# Patient Record
Sex: FEMALE | Race: WHITE
[De-identification: names, ages, dates, MRNs, and addresses within clinical notes are randomized per-mention and may not be internally consistent; named-entity substitution may affect disease eponyms.]

---

## 2015-12-30 VITALS — SYSTOLIC BLOOD PRESSURE: 117 MMHG | DIASTOLIC BLOOD PRESSURE: 81 MMHG

## 2016-02-07 VITALS
DIASTOLIC BLOOD PRESSURE: 91 MMHG | DIASTOLIC BLOOD PRESSURE: 91 MMHG | SYSTOLIC BLOOD PRESSURE: 127 MMHG | SYSTOLIC BLOOD PRESSURE: 127 MMHG

## 2017-03-21 ENCOUNTER — HOSPITAL ENCOUNTER (OUTPATIENT)
Dept: HOSPITAL 63 - ECHO | Age: 37
Discharge: HOME | End: 2017-03-21
Payer: COMMERCIAL

## 2017-03-21 DIAGNOSIS — I07.1: ICD-10-CM

## 2017-03-21 DIAGNOSIS — R00.2: Primary | ICD-10-CM

## 2017-03-21 PROCEDURE — 93306 TTE W/DOPPLER COMPLETE: CPT

## 2017-03-21 NOTE — CARD
--------------- APPROVED REPORT --------------





EXAM: Two-dimensional and M-mode echocardiogram with Doppler and color Doppler.



Other Information 

Quality : Average

Rhythm : NSR



INDICATION

Palpitations 



2D DIMENSIONS 

RVDd2.9 (2.9-3.5cm)Left Atrium(2D)2.9 (1.6-4.0cm)

IVSd0.8 (0.7-1.1cm)Aortic Root(2D)2.9 (2.0-3.7cm)

LVDd4.6 (3.9-5.9cm)PWd0.8 (0.7-1.1cm)

LVDs3.1 (2.5-4.0cm)FS (%) 32.4 %

SV60.4 mlLVEF(%)60.7 (>50%)



Aortic Valve

AoV Peak Jayson.93.2cm/sAoV VTI15.9cm

AO Peak GR.3.5mmHgAO Mean GR.2mmHg

YUMIKO   (VTI)3.38cm2



Mitral Valve

MV E Jlrsvujw26.3cm/sMV E Peak Gr.3mmHg

MV DECEL [id number] A Alqrpsbq40.3cm/s

MV E Mean Gr.2mmHgMV [id number]

E/A  Ratio1.0MV A [id number]

MVA (PHT)3.93cm2



Tricuspid Valve

TR P. Luwqjhgo570mf/sRAP [id number]

TR Peak Gr.72xrHgNKNV44ewHg



 LEFT VENTRICLE 

The left ventricle is normal size. There is normal left ventricular wall thickness. Left ventricle sy
stolic function is normal. The Ejection Fraction is 55-60%. There is normal LV segmental wall motion.
 The left ventricular diastolic function and filling is normal for age.



 RIGHT VENTRICLE 

The right ventricle is normal size. The right ventricular systolic function is normal.



 ATRIA 

The left atrium size is normal. The right atrium size is normal. The interatrial septum is intact wit
h no evidence for an atrial septal defect or patent foramen ovale as noted on 2-D or Doppler imaging.




 AORTIC VALVE 

The aortic valve is normal in structure and function. The aortic valve is trileaflet. Doppler and Col
or Flow revealed no significant aortic regurgitation. There is no significant aortic valvular stenosi
s.



 MITRAL VALVE 

The mitral valve is normal in structure and function. There is no mitral valve stenosis. Doppler and 
Color Flow revealed no mitral valve regurgitation noted.



 TRICUSPID VALVE 

The tricuspid valve is normal in structure and function. Doppler and Color Flow revealed mild tricusp
id regurgitation. The PA pressure was estimated at 38 mmHg. There is no tricuspid valve stenosis.



 PULMONIC VALVE 

The pulmonic valve is not well visualized. Doppler and Color Flow revealed no pulmonic valvular regur
gitation. There is no pulmonic valvular stenosis.



 GREAT VESSELS 

The aortic root is normal in size. Pulmonary veins not recorded. The IVC is normal in size and collap
ses >50% with inspiration.



 PERICARDIAL EFFUSION 

There is no evidence of significant pericardial effusion.



Critical Notification

Critical Value: No



<Conclusion>

The aortic valve is normal in structure and function.

Left ventricle systolic function is normal. The Ejection Fraction is 55-60%.

There is normal LV segmental wall motion.

Doppler and Color Flow revealed mild tricuspid regurgitation.  The PA pressure was estimated at 38 mm
Hg.

## 2017-04-04 ENCOUNTER — HOSPITAL ENCOUNTER (OUTPATIENT)
Dept: HOSPITAL 63 - DXRADRC | Age: 37
Discharge: HOME | End: 2017-04-04
Attending: NURSE PRACTITIONER
Payer: COMMERCIAL

## 2017-04-04 DIAGNOSIS — M51.36: Primary | ICD-10-CM

## 2017-04-04 DIAGNOSIS — M41.87: ICD-10-CM

## 2017-04-04 DIAGNOSIS — M54.5: ICD-10-CM

## 2017-04-04 PROCEDURE — 72100 X-RAY EXAM L-S SPINE 2/3 VWS: CPT

## 2017-04-04 NOTE — RAD
Lumbar spine radiographs 



History:  Lumbar pain for several months.



Comparison:  1/7/2011.



Findings:  



AP and lateral views lumbar spine, 3 images. Mild levoconvex rotary scoliosis

of lumbar spine is seen. No acute fracture or malalignment is identified. 5

lumbar type vertebral bodies are present. Intervertebral disc heights appear

preserved. Mild facet degeneration is seen. No spondylolysis or

spondylolisthesis is seen.



Impression:  

Mild levoconvex scoliosis, similar to previous study. Mild facet degeneration.

## 2018-01-07 ENCOUNTER — HOSPITAL ENCOUNTER (EMERGENCY)
Dept: HOSPITAL 63 - ER | Age: 38
Discharge: HOME | End: 2018-01-07
Payer: COMMERCIAL

## 2018-01-07 VITALS — DIASTOLIC BLOOD PRESSURE: 84 MMHG | SYSTOLIC BLOOD PRESSURE: 141 MMHG

## 2018-01-07 VITALS — HEIGHT: 69 IN | WEIGHT: 233.69 LBS | BODY MASS INDEX: 34.61 KG/M2

## 2018-01-07 DIAGNOSIS — Z88.8: ICD-10-CM

## 2018-01-07 DIAGNOSIS — R53.81: ICD-10-CM

## 2018-01-07 DIAGNOSIS — J45.909: ICD-10-CM

## 2018-01-07 DIAGNOSIS — R05: Primary | ICD-10-CM

## 2018-01-07 DIAGNOSIS — Z88.2: ICD-10-CM

## 2018-01-07 DIAGNOSIS — Z88.4: ICD-10-CM

## 2018-01-07 DIAGNOSIS — R06.02: ICD-10-CM

## 2018-01-07 LAB
ANION GAP SERPL CALC-SCNC: 9 MMOL/L (ref 6–14)
BASOPHILS # BLD AUTO: 0.1 X10^3/UL (ref 0–0.2)
BASOPHILS NFR BLD: 1 % (ref 0–3)
CHLORIDE SERPL-SCNC: 103 MMOL/L (ref 98–107)
CO2 SERPL-SCNC: 30 MMOL/L (ref 21–32)
EOSINOPHIL NFR BLD: 0 X10^3/UL (ref 0–0.7)
EOSINOPHIL NFR BLD: 1 % (ref 0–3)
ERYTHROCYTE [DISTWIDTH] IN BLOOD BY AUTOMATED COUNT: 13.1 % (ref 11.5–14.5)
HCT VFR BLD CALC: 43.5 % (ref 36–47)
HGB BLD-MCNC: 15.4 G/DL (ref 12–15.5)
LYMPHOCYTES # BLD: 4.1 X10^3/UL (ref 1–4.8)
LYMPHOCYTES NFR BLD AUTO: 46 % (ref 24–48)
MCH RBC QN AUTO: 29 PG (ref 25–35)
MCHC RBC AUTO-ENTMCNC: 35 G/DL (ref 31–37)
MCV RBC AUTO: 82 FL (ref 79–100)
MONO #: 0.5 X10^3/UL (ref 0–1.1)
MONOCYTES NFR BLD: 5 % (ref 0–9)
NEUT #: 4.2 X10^3UL (ref 1.8–7.7)
NEUTROPHILS NFR BLD AUTO: 48 % (ref 31–73)
PLATELET # BLD AUTO: 239 X10^3/UL (ref 140–400)
POTASSIUM SERPL-SCNC: 3.4 MMOL/L (ref 3.5–5.1)
RBC # BLD AUTO: 5.33 X10^6/UL (ref 3.5–5.4)
SODIUM SERPL-SCNC: 142 MMOL/L (ref 136–145)
WBC # BLD AUTO: 8.9 X10^3/UL (ref 4–11)

## 2018-01-07 PROCEDURE — 71046 X-RAY EXAM CHEST 2 VIEWS: CPT

## 2018-01-07 PROCEDURE — 36415 COLL VENOUS BLD VENIPUNCTURE: CPT

## 2018-01-07 PROCEDURE — 85025 COMPLETE CBC W/AUTO DIFF WBC: CPT

## 2018-01-07 PROCEDURE — 81025 URINE PREGNANCY TEST: CPT

## 2018-01-07 PROCEDURE — 80051 ELECTROLYTE PANEL: CPT

## 2018-01-07 PROCEDURE — 99285 EMERGENCY DEPT VISIT HI MDM: CPT

## 2018-01-07 PROCEDURE — 83605 ASSAY OF LACTIC ACID: CPT

## 2018-01-07 NOTE — PHYS DOC
Past History


Past Medical History:  Asthma


Past Surgical History:  Other


Alcohol Use:  None


Drug Use:  None





Adult General


Chief Complaint


Chief Complaint:  ALLERGIC REACTION





HPI


HPI





Patient is a 37-year-old female who presents with complaints of persistent 

cough and malaise. Patient is already on antibiotics. Patient has been recently 

treated both for bronchitis as well as sinusitis.  Patient have a little bit of 

a skin reaction after getting some antibiotics at doctor's office but that rash 

is resolving. Patient just doesn't feel that she is getting any better but has 

only been taking the most recent about a prescription for 2 days. Patient 

denies being a smoker.





Review of Systems


Review of Systems





Constitutional: Denies fever or chills []


Eyes: Denies change in visual acuity, redness, or eye pain []


HENT: Yes to nasal congestion, sore throat


Respiratory: Yes to cough, some shortness of breath


Cardiovascular: No chest pain


GI: Denies abdominal pain, nausea, vomiting, 





Musculoskeletal: Denies back pain or joint pain []


Integument: Diffuse erythema abdomen that is resolving


Neurologic: Denies headache, focal weakness or sensory changes []








All other systems were reviewed and found to be within normal limits, except as 

documented in this note.





Current Medications


Current Medications





Current Medications








 Medications


  (Trade)  Dose


 Ordered  Sig/Gely  Start Time


 Stop Time Status Last Admin


Dose Admin


 


 Promethazine HCl/


 Codeine


  (Phenergan With


 Codeine)  10 ml  STAT  PRN  1/7/18 22:00


   UNV  


 











Allergies


Allergies





Allergies








Coded Allergies Type Severity Reaction Last Updated Verified


 


  Sulfa (Sulfonamide Antibiotics) Allergy Intermediate Rash 1/7/18 Yes


 


  nalbuphine Allergy Intermediate  1/28/16 Yes


 


  Anesthetics - Brandie Type- Parabens Adverse Reaction Intermediate HYPERTHERMIA 

1/7/18 Yes











Physical Exam


Physical Exam





Constitutional: Well developed, well nourished, no acute distress, non-toxic 

appearance. []


HENT: Normocephalic, atraumatic, bilateral external ears normal, oropharynx 

moist, no oral exudates, nose normal. Airways patent


Eyes:  EOMI, conjunctiva normal, no discharge. [] 


Neck: Normal range of motion, no tenderness, supple, no stridor. No LAD, no 

meningeal signs


Cardiovascular:Heart rate regular rhythm, no murmur, equal pulses, normal 

perfusion


Lungs & Thorax:  Bilateral breath sounds clear to auscultation, no wheezing, no 

rales, no rhonchi. Good air movement. No tachypnea


Abdomen: soft, no tenderness, no masses, no pulsatile masses. Mild diffuse 

erythema


Skin: Warm, dry, no erythema, no rash. Exception as above


Back: Normal range of motion


Extremities: No tenderness, no DVT, ROM intact, no edema. [] 


Neurologic: Alert and oriented X 3, normal motor function, normal speech, no 

focal deficits noted. []


Psychologic: Affect normal, judgement normal, mood normal. []





Current Patient Data


Vital Signs





 Vital Signs








  Date Time  Temp Pulse Resp B/P (MAP) Pulse Ox O2 Delivery O2 Flow Rate FiO2


 


1/7/18 21:10 98.5 67 24  98 Room Air  


 


1/7/18 20:51    141/84 (103)    








Lab Results





 Laboratory Tests








Test


  1/7/18


21:01


 


White Blood Count


  8.9 x10^3/uL


(4.0-11.0)


 


Red Blood Count


  5.33 x10^6/uL


(3.50-5.40)


 


Hemoglobin


  15.4 g/dL


(12.0-15.5)


 


Hematocrit


  43.5 %


(36.0-47.0)


 


Mean Corpuscular Volume


  82 fL ()


 


 


Mean Corpuscular Hemoglobin 29 pg (25-35)  


 


Mean Corpuscular Hemoglobin


Concent 35 g/dL


(31-37)


 


Red Cell Distribution Width


  13.1 %


(11.5-14.5)


 


Platelet Count


  239 x10^3/uL


(140-400)


 


Neutrophils (%) (Auto) 48 % (31-73)  


 


Lymphocytes (%) (Auto) 46 % (24-48)  


 


Monocytes (%) (Auto) 5 % (0-9)  


 


Eosinophils (%) (Auto) 1 % (0-3)  


 


Basophils (%) (Auto) 1 % (0-3)  


 


Neutrophils # (Auto)


  4.2 x10^3uL


(1.8-7.7)


 


Lymphocytes # (Auto)


  4.1 x10^3/uL


(1.0-4.8)


 


Monocytes # (Auto)


  0.5 x10^3/uL


(0.0-1.1)


 


Eosinophils # (Auto)


  0.0 x10^3/uL


(0.0-0.7)


 


Basophils # (Auto)


  0.1 x10^3/uL


(0.0-0.2)


 


Sodium Level


  142 mmol/L


(136-145)


 


Potassium Level


  3.4 mmol/L


(3.5-5.1)  L


 


Chloride Level


  103 mmol/L


()


 


Carbon Dioxide Level


  30 mmol/L


(21-32)


 


Anion Gap 9 (6-14)  


 


Lactic Acid Level


  1.0 mmol/L


(0.4-2.0)











EKG


EKG


[]





Radiology/Procedures


Radiology/Procedures


[]





Course & Med Decision Making


Course & Med Decision Making


Pertinent Labs and Imaging studies reviewed. (See chart for details)





Premarin a chest x-ray read no acute disease





2210 patient in no significant distress, has dry cough, vital signs 

unremarkable patient saturating 97% on room air, no tachypnea. Labs are 

unremarkable, chest x-rays unremarkable, vital signs are unremarkable, physical 

exam has no major concerning findings. Patient is stable for outpatient follow-

up, I directed the patient to follow with her doctor for recheck and 

reevaluation in 2 days. Patient and family agreed to follow-up as directed.





[]





Dragon Disclaimer


Dragon Disclaimer


This electronic medical record was generated, in whole or in part, using a 

voice recognition dictation system.





Departure


Departure:


Impression:  


 Primary Impression:  


 Cough


Disposition:  01 HOME, SELF-CARE


Condition:  STABLE


Referrals:  


SAMANTHA MURPHY (PCP)


Please follow with your PCP in one to 2 days for recheck and reevaluation.


Patient Instructions:  Cough, Adult


Scripts


Guaifenesin/Codeine Phosphate (Codeine-Guaifen  mg/5 ml) 120 Ml Liquid


5 ML PO TID for 3 Days, #50 LIQUID


   Prov: RITESH SINGH MD         1/7/18 


Benzonatate (TESSALON PERLE) 100 Mg Capsule


1 CAP PO TID, #21 CAP


   Prov: RITESH SINGH MD         1/7/18











RITESH SINGH MD Jan 7, 2018 22:03

## 2018-01-08 NOTE — RAD
PROCEDURE: CHEST PA   LATERAL



CLINICAL INDICATION: Bronchitis, worsening shortness of breath



COMPARISON: Previous study from 10/23/2015



FINDINGS:  

No pneumothorax identified. Cardiac and mediastinal contours unremarkable. No

pulmonary consolidation or acute airspace disease. No acute osseous

abnormalities identified. 



IMPRESSION:

No pulmonary consolidation or acute airspace disease.

## 2019-11-18 ENCOUNTER — HOSPITAL ENCOUNTER (OUTPATIENT)
Dept: HOSPITAL 61 - LAB | Age: 39
Discharge: HOME | End: 2019-11-18
Attending: PSYCHIATRY & NEUROLOGY
Payer: COMMERCIAL

## 2019-11-18 DIAGNOSIS — M79.10: ICD-10-CM

## 2019-11-18 DIAGNOSIS — E79.2: Primary | ICD-10-CM

## 2019-11-18 DIAGNOSIS — R29.898: ICD-10-CM

## 2019-11-18 LAB
ALBUMIN SERPL-MCNC: 3.9 G/DL (ref 3.4–5)
ALBUMIN/GLOB SERPL: 1.1 {RATIO} (ref 1–1.7)
ALP SERPL-CCNC: 51 U/L (ref 46–116)
ALT SERPL-CCNC: 26 U/L (ref 14–59)
ANION GAP SERPL CALC-SCNC: 10 MMOL/L (ref 6–14)
AST SERPL-CCNC: 18 U/L (ref 15–37)
BASOPHILS # BLD AUTO: 0.1 X10^3/UL (ref 0–0.2)
BASOPHILS NFR BLD: 1 % (ref 0–3)
BILIRUB SERPL-MCNC: 0.3 MG/DL (ref 0.2–1)
BUN SERPL-MCNC: 10 MG/DL (ref 7–20)
BUN/CREAT SERPL: 10 (ref 6–20)
CALCIUM SERPL-MCNC: 8.9 MG/DL (ref 8.5–10.1)
CHLORIDE SERPL-SCNC: 104 MMOL/L (ref 98–107)
CK SERPL-CCNC: 144 U/L (ref 26–192)
CO2 SERPL-SCNC: 26 MMOL/L (ref 21–32)
CREAT SERPL-MCNC: 1 MG/DL (ref 0.6–1)
EOSINOPHIL NFR BLD: 0.2 X10^3/UL (ref 0–0.7)
EOSINOPHIL NFR BLD: 2 % (ref 0–3)
ERYTHROCYTE [DISTWIDTH] IN BLOOD BY AUTOMATED COUNT: 13.2 % (ref 11.5–14.5)
GFR SERPLBLD BASED ON 1.73 SQ M-ARVRAT: 62.1 ML/MIN
GLOBULIN SER-MCNC: 3.6 G/DL (ref 2.2–3.8)
GLUCOSE SERPL-MCNC: 86 MG/DL (ref 70–99)
HCT VFR BLD CALC: 43.1 % (ref 36–47)
HGB BLD-MCNC: 14.8 G/DL (ref 12–15.5)
LYMPHOCYTES # BLD: 2.6 X10^3/UL (ref 1–4.8)
LYMPHOCYTES NFR BLD AUTO: 27 % (ref 24–48)
MCH RBC QN AUTO: 29 PG (ref 25–35)
MCHC RBC AUTO-ENTMCNC: 34 G/DL (ref 31–37)
MCV RBC AUTO: 84 FL (ref 79–100)
MONO #: 0.5 X10^3/UL (ref 0–1.1)
MONOCYTES NFR BLD: 5 % (ref 0–9)
NEUT #: 6.4 X10^3/UL (ref 1.8–7.7)
NEUTROPHILS NFR BLD AUTO: 66 % (ref 31–73)
PLATELET # BLD AUTO: 261 X10^3/UL (ref 140–400)
POTASSIUM SERPL-SCNC: 3.8 MMOL/L (ref 3.5–5.1)
PROT SERPL-MCNC: 7.5 G/DL (ref 6.4–8.2)
RBC # BLD AUTO: 5.11 X10^6/UL (ref 3.5–5.4)
SODIUM SERPL-SCNC: 140 MMOL/L (ref 136–145)
T4 FREE SERPL-MCNC: 0.91 NG/DL (ref 0.76–1.46)
THYROID STIM HORMONE (TSH): 3.01 UIU/ML (ref 0.36–3.74)
WBC # BLD AUTO: 9.7 X10^3/UL (ref 4–11)

## 2019-11-18 PROCEDURE — 84443 ASSAY THYROID STIM HORMONE: CPT

## 2019-11-18 PROCEDURE — 85025 COMPLETE CBC W/AUTO DIFF WBC: CPT

## 2019-11-18 PROCEDURE — 80053 COMPREHEN METABOLIC PANEL: CPT

## 2019-11-18 PROCEDURE — 82607 VITAMIN B-12: CPT

## 2019-11-18 PROCEDURE — 84439 ASSAY OF FREE THYROXINE: CPT

## 2019-11-18 PROCEDURE — 82550 ASSAY OF CK (CPK): CPT

## 2019-11-18 PROCEDURE — 86141 C-REACTIVE PROTEIN HS: CPT

## 2019-11-18 PROCEDURE — 36415 COLL VENOUS BLD VENIPUNCTURE: CPT
